# Patient Record
Sex: FEMALE | Race: WHITE | NOT HISPANIC OR LATINO | URBAN - METROPOLITAN AREA
[De-identification: names, ages, dates, MRNs, and addresses within clinical notes are randomized per-mention and may not be internally consistent; named-entity substitution may affect disease eponyms.]

---

## 2018-10-08 ENCOUNTER — APPOINTMENT (OUTPATIENT)
Dept: URBAN - METROPOLITAN AREA CLINIC 217 | Age: 68
Setting detail: DERMATOLOGY
End: 2018-10-08

## 2018-10-08 DIAGNOSIS — L40.0 PSORIASIS VULGARIS: ICD-10-CM

## 2018-10-08 PROBLEM — M12.9 ARTHROPATHY, UNSPECIFIED: Status: ACTIVE | Noted: 2018-10-08

## 2018-10-08 PROCEDURE — 99203 OFFICE O/P NEW LOW 30 MIN: CPT

## 2018-10-08 PROCEDURE — OTHER TREATMENT REGIMEN: OTHER

## 2018-10-08 PROCEDURE — OTHER MIPS QUALITY: OTHER

## 2018-10-08 PROCEDURE — OTHER COUNSELING: OTHER

## 2018-10-08 PROCEDURE — OTHER PRESCRIPTION: OTHER

## 2018-10-08 PROCEDURE — OTHER EDUCATIONAL RESOURCES PROVIDED: OTHER

## 2018-10-08 RX ORDER — MOMETASONE FUROATE 1 MG/G
OINTMENT TOPICAL
Qty: 1 | Refills: 2 | Status: ERX | COMMUNITY
Start: 2018-10-08

## 2018-10-08 ASSESSMENT — PGA PSORIASIS: PGA PSORIASIS: MARKED (MARKED PLAQUE ELEVATION, BRIGHT ERYTHEMA, THICK NONTENACIOUS SCALE PREDOMINATES)

## 2018-10-08 ASSESSMENT — BSA PSORIASIS: % BODY COVERED IN PSORIASIS: 8

## 2018-10-08 NOTE — HPI: RASH
What Type Of Note Output Would You Prefer (Optional)?: Standard Output
How Severe Is Your Rash?: moderate
Is This A New Presentation, Or A Follow-Up?: Rash
Additional History: Patient was treat for psoriasis in past by Dr Sandoval, she was on topical steroids but it’s not helping any longer, she has been under a lot of stress over the past couple of years

## 2018-10-08 NOTE — PROCEDURE: TREATMENT REGIMEN
Action 3: Continue
Detail Level: Zone
Start Regimen: Mometasone 0.1% ointment daily PRN
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

## 2018-10-08 NOTE — PROCEDURE: MIPS QUALITY
Quality 110: Preventive Care And Screening: Influenza Immunization: Influenza Immunization Ordered or Recommended, but not Administered due to system reason
Quality 226: Preventive Care And Screening: Tobacco Use: Screening And Cessation Intervention: Patient screened for tobacco and is a smoker AND received Cessation Counseling
Quality 111:Pneumonia Vaccination Status For Older Adults: Pneumococcal Vaccination Previously Received
Detail Level: Detailed

## 2019-01-24 ENCOUNTER — RX ONLY (RX ONLY)
Age: 69
End: 2019-01-24

## 2019-01-24 RX ORDER — CALCIPOTRIENE 50 UG/G
OINTMENT TOPICAL DAILY PRN
Qty: 1 | Refills: 5 | Status: ERX

## 2019-04-17 ENCOUNTER — APPOINTMENT (OUTPATIENT)
Dept: URBAN - METROPOLITAN AREA CLINIC 217 | Age: 69
Setting detail: DERMATOLOGY
End: 2019-04-17

## 2019-04-17 DIAGNOSIS — L40.0 PSORIASIS VULGARIS: ICD-10-CM

## 2019-04-17 DIAGNOSIS — L82.1 OTHER SEBORRHEIC KERATOSIS: ICD-10-CM

## 2019-04-17 PROBLEM — F41.9 ANXIETY DISORDER, UNSPECIFIED: Status: ACTIVE | Noted: 2019-04-17

## 2019-04-17 PROCEDURE — 99214 OFFICE O/P EST MOD 30 MIN: CPT

## 2019-04-17 PROCEDURE — OTHER COUNSELING: OTHER

## 2019-04-17 PROCEDURE — OTHER TREATMENT REGIMEN: OTHER

## 2019-04-17 PROCEDURE — OTHER REASSURANCE: OTHER

## 2019-04-17 ASSESSMENT — PGA PSORIASIS: PGA PSORIASIS: MODERATE (MODERATE PLAQUE ELEVATION, MODERATE ERYTHEMA, COARSE SCALE PREDOMINATES)

## 2019-04-17 ASSESSMENT — LOCATION ZONE DERM
LOCATION ZONE: TRUNK
LOCATION ZONE: ARM

## 2019-04-17 ASSESSMENT — LOCATION SIMPLE DESCRIPTION DERM
LOCATION SIMPLE: LEFT SHOULDER
LOCATION SIMPLE: LEFT UPPER BACK

## 2019-04-17 ASSESSMENT — LOCATION DETAILED DESCRIPTION DERM
LOCATION DETAILED: LEFT POSTERIOR SHOULDER
LOCATION DETAILED: LEFT SUPERIOR MEDIAL UPPER BACK

## 2019-04-17 NOTE — PROCEDURE: TREATMENT REGIMEN
Other Instructions: Discussed proper usage of Mometasone cream and encouraged better compliance also discussed use of calcipotriene and reassured her that it is ok to use with the Mometasone cream
Action 4: Continue
Start Regimen: Calcipotriene 0.005% cream apply daily
Detail Level: Zone
Continue Regimen: Mometasone 0.1% ointment daily PRN\\nDesonide 0.05% cream BID PRN to psoriasis of gluteal cleft

## 2020-04-29 ENCOUNTER — RX ONLY (RX ONLY)
Age: 70
End: 2020-04-29

## 2020-04-29 RX ORDER — CALCIPOTRIENE 0.05 MG/G
OINTMENT TOPICAL DAILY PRN
Qty: 1 | Refills: 0 | Status: ERX | COMMUNITY
Start: 2020-04-29

## 2020-04-29 RX ORDER — MOMETASONE FUROATE 1 MG/G
OINTMENT TOPICAL
Qty: 1 | Refills: 0 | Status: ERX

## 2020-06-15 ENCOUNTER — APPOINTMENT (OUTPATIENT)
Dept: URBAN - METROPOLITAN AREA CLINIC 217 | Age: 70
Setting detail: DERMATOLOGY
End: 2020-06-15

## 2020-06-15 DIAGNOSIS — L40.0 PSORIASIS VULGARIS: ICD-10-CM

## 2020-06-15 DIAGNOSIS — L71.0 PERIORAL DERMATITIS: ICD-10-CM

## 2020-06-15 PROCEDURE — OTHER COUNSELING: OTHER

## 2020-06-15 PROCEDURE — OTHER TREATMENT REGIMEN: OTHER

## 2020-06-15 PROCEDURE — OTHER PRESCRIPTION: OTHER

## 2020-06-15 PROCEDURE — OTHER EDUCATIONAL RESOURCES PROVIDED: OTHER

## 2020-06-15 PROCEDURE — 99214 OFFICE O/P EST MOD 30 MIN: CPT

## 2020-06-15 RX ORDER — MOMETASONE FUROATE 1 MG/G
CREAM TOPICAL DAILY
Qty: 1 | Refills: 2 | Status: ERX | COMMUNITY
Start: 2020-06-15

## 2020-06-15 RX ORDER — METRONIDAZOLE 7.5 MG/G
GEL TOPICAL
Qty: 1 | Refills: 3 | Status: ERX | COMMUNITY
Start: 2020-06-15

## 2020-06-15 RX ORDER — CALCIPOTRIENE 0.05 MG/G
CREAM TOPICAL
Qty: 1 | Refills: 3 | Status: ERX

## 2020-06-15 NOTE — PROCEDURE: TREATMENT REGIMEN
Continue Regimen: Mometasone 0.1% ointment daily PRN\\nDesonide 0.05% cream BID PRN to psoriasis of gluteal cleft\\nCalcipotriene  0.05% BID PRN
Detail Level: Detailed
Detail Level: Zone
Action 3: Continue
Initiate Treatment: Metronidazole 0,75% BID
Other Instructions: Patient admittantly non compliant with using medications, discussed (again) proper use of all the medications.
Discontinue Regimen: OTC HC

## 2020-08-28 ENCOUNTER — RX ONLY (RX ONLY)
Age: 70
End: 2020-08-28

## 2020-08-28 RX ORDER — CLINDAMYCIN PHOSPHATE 10 MG/ML
LOTION TOPICAL
Qty: 1 | Refills: 3 | Status: ERX | COMMUNITY
Start: 2020-08-28

## 2020-10-21 ENCOUNTER — APPOINTMENT (OUTPATIENT)
Dept: URBAN - METROPOLITAN AREA CLINIC 217 | Age: 70
Setting detail: DERMATOLOGY
End: 2020-10-21

## 2020-10-21 VITALS — TEMPERATURE: 97.4 F

## 2020-10-21 DIAGNOSIS — L71.0 PERIORAL DERMATITIS: ICD-10-CM

## 2020-10-21 PROCEDURE — OTHER PRESCRIPTION: OTHER

## 2020-10-21 PROCEDURE — OTHER COUNSELING: OTHER

## 2020-10-21 PROCEDURE — OTHER MIPS QUALITY: OTHER

## 2020-10-21 PROCEDURE — OTHER TREATMENT REGIMEN: OTHER

## 2020-10-21 PROCEDURE — 99213 OFFICE O/P EST LOW 20 MIN: CPT

## 2020-10-21 RX ORDER — CLINDAMYCIN PHOSPHATE 10 MG/ML
LOTION TOPICAL
Qty: 1 | Refills: 2 | Status: ERX | COMMUNITY
Start: 2020-10-21

## 2020-10-21 ASSESSMENT — INVESTIGATOR STATIC GLOBAL ASSESSMENT: IN YOUR EXPERIENCE, AMONG ALL PATIENTS YOU HAVE SEEN WITH THIS CONDITION, HOW SEVERE IS THIS PATIENT'S CONDITION?: MILD

## 2020-10-21 NOTE — PROCEDURE: MIPS QUALITY
Quality 111:Pneumonia Vaccination Status For Older Adults: Pneumococcal Vaccination Previously Received
Quality 226: Preventive Care And Screening: Tobacco Use: Screening And Cessation Intervention: Patient screened for tobacco and is a smoker AND received Cessation Counseling
Detail Level: Detailed
Quality 110: Preventive Care And Screening: Influenza Immunization: Influenza Immunization Ordered or Recommended, but not Administered due to system reason

## 2020-10-21 NOTE — PROCEDURE: TREATMENT REGIMEN
Detail Level: Detailed
Discontinue Regimen: Metronidazole 0.75% BID due to ineffectiveness
Plan: Discussed option of initiating compounded triple rosacea vs rx of Clindamycin (previously prescribed, but patient never used), mutually agree to start Clindamycin at this time since she has it on hand
Initiate Treatment: Clindamycin lotion 1% BID PRN

## 2020-11-30 RX ORDER — MOMETASONE FUROATE 1 MG/G
CREAM TOPICAL DAILY
Qty: 1 | Refills: 2 | Status: ERX

## 2020-12-17 ENCOUNTER — RX ONLY (RX ONLY)
Age: 70
End: 2020-12-17

## 2020-12-17 RX ORDER — AZELAIC ACID 0.15 G/G
GEL TOPICAL
Qty: 1 | Refills: 3 | Status: ERX | COMMUNITY
Start: 2020-12-17

## 2021-01-12 ENCOUNTER — RX ONLY (RX ONLY)
Age: 71
End: 2021-01-12

## 2021-01-12 RX ORDER — IVERMECTIN 5 MG/G
LOTION TOPICAL
Qty: 1 | Refills: 2 | Status: ERX | COMMUNITY
Start: 2021-01-12

## 2022-07-13 ENCOUNTER — APPOINTMENT (OUTPATIENT)
Dept: URBAN - METROPOLITAN AREA CLINIC 217 | Age: 72
Setting detail: DERMATOLOGY
End: 2022-07-13

## 2022-07-13 DIAGNOSIS — L40.0 PSORIASIS VULGARIS: ICD-10-CM

## 2022-07-13 DIAGNOSIS — L65.0 TELOGEN EFFLUVIUM: ICD-10-CM

## 2022-07-13 PROBLEM — K21.9 GASTRO-ESOPHAGEAL REFLUX DISEASE WITHOUT ESOPHAGITIS: Status: ACTIVE | Noted: 2022-07-13

## 2022-07-13 PROBLEM — E78.5 HYPERLIPIDEMIA, UNSPECIFIED: Status: ACTIVE | Noted: 2022-07-13

## 2022-07-13 PROCEDURE — 99214 OFFICE O/P EST MOD 30 MIN: CPT

## 2022-07-13 PROCEDURE — OTHER TREATMENT REGIMEN: OTHER

## 2022-07-13 PROCEDURE — OTHER MIPS QUALITY: OTHER

## 2022-07-13 PROCEDURE — OTHER DIAGNOSIS COMMENT: OTHER

## 2022-07-13 PROCEDURE — OTHER PRESCRIPTION: OTHER

## 2022-07-13 PROCEDURE — OTHER COUNSELING: OTHER

## 2022-07-13 RX ORDER — HYDROCORTISONE BUTYRATE 1 MG/G
CREAM TOPICAL
Qty: 45 | Refills: 3 | Status: ERX | COMMUNITY
Start: 2022-07-13

## 2022-07-13 NOTE — PROCEDURE: DIAGNOSIS COMMENT
Render Risk Assessment In Note?: yes
Detail Level: Simple
Comment: Favor TE. Due to stress of past surgeries this past Jan of 2022/thyroid disease / hx bariatric surgery and malabsorption issues . 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 Mechanical issues are not an issue. No new medications

## 2022-07-13 NOTE — PROCEDURE: TREATMENT REGIMEN
Action 4: Continue
Detail Level: Detailed
Plan: Decrease stress, await pending labs that patient had drawn prior to appointment for systemic disease.
Start Regimen: hydrocortisone butyrate 0.1 % topical cream apply BID PRN
Otc Regimen: Vivivscal pro daily \\nRogaine topical use daily to scalp

## 2022-07-13 NOTE — HPI: HAIR LOSS
Previous Labs: Yes
How Did The Hair Loss Occur?: gradual in onset
How Severe Is Your Hair Loss?: mild
Additional History: \\n\\nShe was diagnosed as Alopecia when she was 29 years old by [facility]
When Were The Labs Drawn? (Drawn...): 7/13/22 by pcp and cardiologist

## 2022-07-13 NOTE — PROCEDURE: MIPS QUALITY
Quality 130: Documentation Of Current Medications In The Medical Record: Current Medications Documented
Quality 110: Preventive Care And Screening: Influenza Immunization: Influenza Immunization Ordered or Recommended, but not Administered due to system reason
Quality 111:Pneumonia Vaccination Status For Older Adults: Pneumococcal vaccine administered on or after patient’s 60th birthday and before the end of the measurement period
Detail Level: Generalized